# Patient Record
Sex: MALE | Race: WHITE
[De-identification: names, ages, dates, MRNs, and addresses within clinical notes are randomized per-mention and may not be internally consistent; named-entity substitution may affect disease eponyms.]

---
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
## 2022-02-12 ENCOUNTER — HOSPITAL ENCOUNTER (INPATIENT)
Facility: MEDICAL CENTER | Age: 22
LOS: 1 days | DRG: 090 | End: 2022-02-13
Attending: EMERGENCY MEDICINE | Admitting: SURGERY
Payer: COMMERCIAL

## 2022-02-12 ENCOUNTER — APPOINTMENT (OUTPATIENT)
Dept: RADIOLOGY | Facility: MEDICAL CENTER | Age: 22
DRG: 090 | End: 2022-02-12
Attending: SURGERY
Payer: COMMERCIAL

## 2022-02-12 ENCOUNTER — APPOINTMENT (OUTPATIENT)
Dept: RADIOLOGY | Facility: MEDICAL CENTER | Age: 22
DRG: 090 | End: 2022-02-12
Attending: EMERGENCY MEDICINE
Payer: COMMERCIAL

## 2022-02-12 DIAGNOSIS — S09.90XA CLOSED HEAD INJURY, INITIAL ENCOUNTER: ICD-10-CM

## 2022-02-12 DIAGNOSIS — S01.111A LACERATION OF RIGHT EYEBROW, INITIAL ENCOUNTER: ICD-10-CM

## 2022-02-12 DIAGNOSIS — R46.89 COMBATIVE BEHAVIOR: ICD-10-CM

## 2022-02-12 PROBLEM — Z53.09 CONTRAINDICATION TO DEEP VEIN THROMBOSIS (DVT) PROPHYLAXIS: Status: ACTIVE | Noted: 2022-02-12

## 2022-02-12 PROBLEM — Z11.52 ENCOUNTER FOR SCREENING FOR COVID-19: Status: ACTIVE | Noted: 2022-02-12

## 2022-02-12 PROBLEM — T14.90XA TRAUMA: Status: ACTIVE | Noted: 2022-02-12

## 2022-02-12 PROBLEM — S06.0X9A CONCUSSION WITH LOSS OF CONSCIOUSNESS: Status: ACTIVE | Noted: 2022-02-12

## 2022-02-12 PROBLEM — F07.81 CONCUSSION SYNDROME: Status: ACTIVE | Noted: 2022-02-12

## 2022-02-12 LAB
ABO GROUP BLD: NORMAL
ALBUMIN SERPL BCP-MCNC: 5 G/DL (ref 3.2–4.9)
ALBUMIN/GLOB SERPL: 1.9 G/DL
ALP SERPL-CCNC: 192 U/L (ref 30–99)
ALT SERPL-CCNC: 21 U/L (ref 2–50)
AMPHET UR QL SCN: NEGATIVE
ANION GAP SERPL CALC-SCNC: 18 MMOL/L (ref 7–16)
APTT PPP: 27.9 SEC (ref 24.7–36)
AST SERPL-CCNC: 33 U/L (ref 12–45)
BARBITURATES UR QL SCN: NEGATIVE
BENZODIAZ UR QL SCN: POSITIVE
BILIRUB SERPL-MCNC: 0.4 MG/DL (ref 0.1–1.5)
BLD GP AB SCN SERPL QL: NORMAL
BUN SERPL-MCNC: 13 MG/DL (ref 8–22)
BZE UR QL SCN: NEGATIVE
CALCIUM SERPL-MCNC: 9.6 MG/DL (ref 8.5–10.5)
CANNABINOIDS UR QL SCN: NEGATIVE
CHLORIDE SERPL-SCNC: 102 MMOL/L (ref 96–112)
CO2 SERPL-SCNC: 20 MMOL/L (ref 20–33)
CREAT SERPL-MCNC: 0.76 MG/DL (ref 0.5–1.4)
ERYTHROCYTE [DISTWIDTH] IN BLOOD BY AUTOMATED COUNT: 37.7 FL (ref 35.9–50)
ETHANOL BLD-MCNC: <10.1 MG/DL (ref 0–10)
GLOBULIN SER CALC-MCNC: 2.7 G/DL (ref 1.9–3.5)
GLUCOSE SERPL-MCNC: 148 MG/DL (ref 65–99)
HCT VFR BLD AUTO: 46.4 % (ref 42–52)
HGB BLD-MCNC: 16.2 G/DL (ref 14–18)
INR PPP: 1.12 (ref 0.87–1.13)
MCH RBC QN AUTO: 30.2 PG (ref 27–33)
MCHC RBC AUTO-ENTMCNC: 34.9 G/DL (ref 33.7–35.3)
MCV RBC AUTO: 86.4 FL (ref 81.4–97.8)
METHADONE UR QL SCN: NEGATIVE
OPIATES UR QL SCN: NEGATIVE
OXYCODONE UR QL SCN: NEGATIVE
PCP UR QL SCN: NEGATIVE
PLATELET # BLD AUTO: 181 K/UL (ref 164–446)
PMV BLD AUTO: 10.6 FL (ref 9–12.9)
POTASSIUM SERPL-SCNC: 3.4 MMOL/L (ref 3.6–5.5)
PROPOXYPH UR QL SCN: NEGATIVE
PROT SERPL-MCNC: 7.7 G/DL (ref 6–8.2)
PROTHROMBIN TIME: 14.1 SEC (ref 12–14.6)
RBC # BLD AUTO: 5.37 M/UL (ref 4.7–6.1)
RH BLD: NORMAL
SODIUM SERPL-SCNC: 140 MMOL/L (ref 135–145)
WBC # BLD AUTO: 12.8 K/UL (ref 4.8–10.8)

## 2022-02-12 PROCEDURE — 770022 HCHG ROOM/CARE - ICU (200)

## 2022-02-12 PROCEDURE — 72125 CT NECK SPINE W/O DYE: CPT

## 2022-02-12 PROCEDURE — 71260 CT THORAX DX C+: CPT

## 2022-02-12 PROCEDURE — 80307 DRUG TEST PRSMV CHEM ANLYZR: CPT

## 2022-02-12 PROCEDURE — A9270 NON-COVERED ITEM OR SERVICE: HCPCS | Performed by: SURGERY

## 2022-02-12 PROCEDURE — 86850 RBC ANTIBODY SCREEN: CPT

## 2022-02-12 PROCEDURE — 0HQ1XZZ REPAIR FACE SKIN, EXTERNAL APPROACH: ICD-10-PCS | Performed by: EMERGENCY MEDICINE

## 2022-02-12 PROCEDURE — 72131 CT LUMBAR SPINE W/O DYE: CPT

## 2022-02-12 PROCEDURE — 700105 HCHG RX REV CODE 258: Performed by: SURGERY

## 2022-02-12 PROCEDURE — 82077 ASSAY SPEC XCP UR&BREATH IA: CPT

## 2022-02-12 PROCEDURE — 99291 CRITICAL CARE FIRST HOUR: CPT

## 2022-02-12 PROCEDURE — 85610 PROTHROMBIN TIME: CPT

## 2022-02-12 PROCEDURE — 87400 INFLUENZA A/B EACH AG IA: CPT

## 2022-02-12 PROCEDURE — 86900 BLOOD TYPING SEROLOGIC ABO: CPT

## 2022-02-12 PROCEDURE — 700111 HCHG RX REV CODE 636 W/ 250 OVERRIDE (IP)

## 2022-02-12 PROCEDURE — 71045 X-RAY EXAM CHEST 1 VIEW: CPT

## 2022-02-12 PROCEDURE — 96374 THER/PROPH/DIAG INJ IV PUSH: CPT

## 2022-02-12 PROCEDURE — G0390 TRAUMA RESPONS W/HOSP CRITI: HCPCS

## 2022-02-12 PROCEDURE — 99291 CRITICAL CARE FIRST HOUR: CPT | Performed by: SURGERY

## 2022-02-12 PROCEDURE — 85027 COMPLETE CBC AUTOMATED: CPT

## 2022-02-12 PROCEDURE — 86901 BLOOD TYPING SEROLOGIC RH(D): CPT

## 2022-02-12 PROCEDURE — 72128 CT CHEST SPINE W/O DYE: CPT

## 2022-02-12 PROCEDURE — 304217 HCHG IRRIGATION SYSTEM

## 2022-02-12 PROCEDURE — 700101 HCHG RX REV CODE 250: Performed by: SURGERY

## 2022-02-12 PROCEDURE — 70450 CT HEAD/BRAIN W/O DYE: CPT

## 2022-02-12 PROCEDURE — 85730 THROMBOPLASTIN TIME PARTIAL: CPT

## 2022-02-12 PROCEDURE — 80053 COMPREHEN METABOLIC PANEL: CPT

## 2022-02-12 PROCEDURE — 304999 HCHG REPAIR-SIMPLE/INTERMED LEVEL 1

## 2022-02-12 PROCEDURE — 72170 X-RAY EXAM OF PELVIS: CPT

## 2022-02-12 PROCEDURE — 700102 HCHG RX REV CODE 250 W/ 637 OVERRIDE(OP): Performed by: SURGERY

## 2022-02-12 PROCEDURE — 87426 SARSCOV CORONAVIRUS AG IA: CPT

## 2022-02-12 PROCEDURE — 700117 HCHG RX CONTRAST REV CODE 255: Performed by: EMERGENCY MEDICINE

## 2022-02-12 PROCEDURE — 303747 HCHG EXTRA SUTURE

## 2022-02-12 RX ORDER — LORAZEPAM 2 MG/ML
3-4 INJECTION INTRAMUSCULAR
Status: DISCONTINUED | OUTPATIENT
Start: 2022-02-12 | End: 2022-02-13 | Stop reason: HOSPADM

## 2022-02-12 RX ORDER — HYDROMORPHONE HYDROCHLORIDE 1 MG/ML
1 INJECTION, SOLUTION INTRAMUSCULAR; INTRAVENOUS; SUBCUTANEOUS
Status: DISCONTINUED | OUTPATIENT
Start: 2022-02-12 | End: 2022-02-13 | Stop reason: HOSPADM

## 2022-02-12 RX ORDER — SODIUM CHLORIDE 9 MG/ML
INJECTION, SOLUTION INTRAVENOUS CONTINUOUS
Status: DISCONTINUED | OUTPATIENT
Start: 2022-02-12 | End: 2022-02-13 | Stop reason: HOSPADM

## 2022-02-12 RX ORDER — DOCUSATE SODIUM 100 MG/1
100 CAPSULE, LIQUID FILLED ORAL 2 TIMES DAILY
Status: DISCONTINUED | OUTPATIENT
Start: 2022-02-12 | End: 2022-02-13 | Stop reason: HOSPADM

## 2022-02-12 RX ORDER — ONDANSETRON 2 MG/ML
4 INJECTION INTRAMUSCULAR; INTRAVENOUS EVERY 4 HOURS PRN
Status: DISCONTINUED | OUTPATIENT
Start: 2022-02-12 | End: 2022-02-13 | Stop reason: HOSPADM

## 2022-02-12 RX ORDER — AMOXICILLIN 250 MG
1 CAPSULE ORAL
Status: DISCONTINUED | OUTPATIENT
Start: 2022-02-12 | End: 2022-02-13 | Stop reason: HOSPADM

## 2022-02-12 RX ORDER — IBUPROFEN 200 MG
400 TABLET ORAL PRN
COMMUNITY

## 2022-02-12 RX ORDER — POLYETHYLENE GLYCOL 3350 17 G/17G
1 POWDER, FOR SOLUTION ORAL 2 TIMES DAILY
Status: DISCONTINUED | OUTPATIENT
Start: 2022-02-12 | End: 2022-02-13 | Stop reason: HOSPADM

## 2022-02-12 RX ORDER — ENEMA 19; 7 G/133ML; G/133ML
1 ENEMA RECTAL
Status: DISCONTINUED | OUTPATIENT
Start: 2022-02-12 | End: 2022-02-13 | Stop reason: HOSPADM

## 2022-02-12 RX ORDER — ACETAMINOPHEN 650 MG/1
975 SUPPOSITORY RECTAL EVERY 6 HOURS PRN
Status: DISCONTINUED | OUTPATIENT
Start: 2022-02-17 | End: 2022-02-13

## 2022-02-12 RX ORDER — BISACODYL 10 MG
10 SUPPOSITORY, RECTAL RECTAL
Status: DISCONTINUED | OUTPATIENT
Start: 2022-02-12 | End: 2022-02-13 | Stop reason: HOSPADM

## 2022-02-12 RX ORDER — KETAMINE HYDROCHLORIDE 50 MG/ML
INJECTION, SOLUTION INTRAMUSCULAR; INTRAVENOUS
Status: COMPLETED | OUTPATIENT
Start: 2022-02-12 | End: 2022-02-12

## 2022-02-12 RX ORDER — LORAZEPAM 2 MG/ML
1-2 INJECTION INTRAMUSCULAR
Status: DISCONTINUED | OUTPATIENT
Start: 2022-02-12 | End: 2022-02-13 | Stop reason: HOSPADM

## 2022-02-12 RX ORDER — ACETAMINOPHEN 650 MG/1
975 SUPPOSITORY RECTAL EVERY 6 HOURS
Status: DISCONTINUED | OUTPATIENT
Start: 2022-02-12 | End: 2022-02-13

## 2022-02-12 RX ORDER — MIDAZOLAM HYDROCHLORIDE 1 MG/ML
INJECTION INTRAMUSCULAR; INTRAVENOUS
Status: COMPLETED
Start: 2022-02-12 | End: 2022-02-12

## 2022-02-12 RX ORDER — CETIRIZINE HYDROCHLORIDE 10 MG/1
10 TABLET ORAL DAILY
COMMUNITY

## 2022-02-12 RX ORDER — HYDROMORPHONE HYDROCHLORIDE 1 MG/ML
0.5 INJECTION, SOLUTION INTRAMUSCULAR; INTRAVENOUS; SUBCUTANEOUS
Status: DISCONTINUED | OUTPATIENT
Start: 2022-02-12 | End: 2022-02-13 | Stop reason: HOSPADM

## 2022-02-12 RX ORDER — ONDANSETRON 4 MG/1
4 TABLET, ORALLY DISINTEGRATING ORAL EVERY 4 HOURS PRN
Status: DISCONTINUED | OUTPATIENT
Start: 2022-02-12 | End: 2022-02-13 | Stop reason: HOSPADM

## 2022-02-12 RX ORDER — AMOXICILLIN 250 MG
1 CAPSULE ORAL NIGHTLY
Status: DISCONTINUED | OUTPATIENT
Start: 2022-02-12 | End: 2022-02-13 | Stop reason: HOSPADM

## 2022-02-12 RX ADMIN — IOHEXOL 80 ML: 350 INJECTION, SOLUTION INTRAVENOUS at 16:32

## 2022-02-12 RX ADMIN — SODIUM CHLORIDE: 9 INJECTION, SOLUTION INTRAVENOUS at 19:00

## 2022-02-12 RX ADMIN — ACETAMINOPHEN 975 MG: 650 SUPPOSITORY RECTAL at 19:00

## 2022-02-12 RX ADMIN — KETAMINE HYDROCHLORIDE 50 MG: 50 INJECTION INTRAMUSCULAR; INTRAVENOUS at 16:01

## 2022-02-12 RX ADMIN — SENNOSIDES AND DOCUSATE SODIUM 1 TABLET: 50; 8.6 TABLET ORAL at 21:13

## 2022-02-12 RX ADMIN — MIDAZOLAM HYDROCHLORIDE 4 MG: 1 INJECTION, SOLUTION INTRAMUSCULAR; INTRAVENOUS at 17:19

## 2022-02-12 RX ADMIN — KETAMINE HYDROCHLORIDE 50 MG: 50 INJECTION INTRAMUSCULAR; INTRAVENOUS at 16:04

## 2022-02-12 ASSESSMENT — LIFESTYLE VARIABLES
EVER HAD A DRINK FIRST THING IN THE MORNING TO STEADY YOUR NERVES TO GET RID OF A HANGOVER: NO
ALCOHOL_USE: NO
EVER FELT BAD OR GUILTY ABOUT YOUR DRINKING: NO
HOW MANY TIMES IN THE PAST YEAR HAVE YOU HAD 5 OR MORE DRINKS IN A DAY: 0
TOTAL SCORE: 0
HAVE PEOPLE ANNOYED YOU BY CRITICIZING YOUR DRINKING: NO
TOTAL SCORE: 0
CONSUMPTION TOTAL: NEGATIVE
HAVE YOU EVER FELT YOU SHOULD CUT DOWN ON YOUR DRINKING: NO
ON A TYPICAL DAY WHEN YOU DRINK ALCOHOL HOW MANY DRINKS DO YOU HAVE: 0
AVERAGE NUMBER OF DAYS PER WEEK YOU HAVE A DRINK CONTAINING ALCOHOL: 0
TOTAL SCORE: 0
DOES PATIENT WANT TO STOP DRINKING: NO

## 2022-02-12 ASSESSMENT — COGNITIVE AND FUNCTIONAL STATUS - GENERAL
DAILY ACTIVITIY SCORE: 24
SUGGESTED CMS G CODE MODIFIER MOBILITY: CH
SUGGESTED CMS G CODE MODIFIER DAILY ACTIVITY: CH
MOBILITY SCORE: 24

## 2022-02-12 ASSESSMENT — PATIENT HEALTH QUESTIONNAIRE - PHQ9
SUM OF ALL RESPONSES TO PHQ9 QUESTIONS 1 AND 2: 0
2. FEELING DOWN, DEPRESSED, IRRITABLE, OR HOPELESS: NOT AT ALL
1. LITTLE INTEREST OR PLEASURE IN DOING THINGS: NOT AT ALL

## 2022-02-12 ASSESSMENT — FIBROSIS 4 INDEX
FIB4 SCORE: 0.76
FIB4 SCORE: 0.76

## 2022-02-13 VITALS
SYSTOLIC BLOOD PRESSURE: 110 MMHG | HEIGHT: 67 IN | DIASTOLIC BLOOD PRESSURE: 56 MMHG | WEIGHT: 119.27 LBS | OXYGEN SATURATION: 97 % | RESPIRATION RATE: 17 BRPM | BODY MASS INDEX: 18.72 KG/M2 | TEMPERATURE: 97.8 F | HEART RATE: 54 BPM

## 2022-02-13 PROBLEM — S01.81XA FACE LACERATIONS, INITIAL ENCOUNTER: Status: ACTIVE | Noted: 2022-02-13

## 2022-02-13 LAB
ABO + RH BLD: NORMAL
ALBUMIN SERPL BCP-MCNC: 4 G/DL (ref 3.2–4.9)
ALBUMIN/GLOB SERPL: 1.9 G/DL
ALP SERPL-CCNC: 153 U/L (ref 30–99)
ALT SERPL-CCNC: 16 U/L (ref 2–50)
ANION GAP SERPL CALC-SCNC: 10 MMOL/L (ref 7–16)
AST SERPL-CCNC: 31 U/L (ref 12–45)
BASOPHILS # BLD AUTO: 0.3 % (ref 0–1.8)
BASOPHILS # BLD: 0.03 K/UL (ref 0–0.12)
BILIRUB SERPL-MCNC: 0.6 MG/DL (ref 0.1–1.5)
BUN SERPL-MCNC: 10 MG/DL (ref 8–22)
CALCIUM SERPL-MCNC: 8.9 MG/DL (ref 8.5–10.5)
CHLORIDE SERPL-SCNC: 108 MMOL/L (ref 96–112)
CO2 SERPL-SCNC: 23 MMOL/L (ref 20–33)
CREAT SERPL-MCNC: 0.65 MG/DL (ref 0.5–1.4)
EOSINOPHIL # BLD AUTO: 0.29 K/UL (ref 0–0.51)
EOSINOPHIL NFR BLD: 3.1 % (ref 0–6.9)
ERYTHROCYTE [DISTWIDTH] IN BLOOD BY AUTOMATED COUNT: 38.6 FL (ref 35.9–50)
FLUAV AG SPEC QL IA: NEGATIVE
FLUBV AG SPEC QL IA: NEGATIVE
GLOBULIN SER CALC-MCNC: 2.1 G/DL (ref 1.9–3.5)
GLUCOSE SERPL-MCNC: 94 MG/DL (ref 65–99)
HCT VFR BLD AUTO: 41.6 % (ref 42–52)
HGB BLD-MCNC: 14.3 G/DL (ref 14–18)
IMM GRANULOCYTES # BLD AUTO: 0.02 K/UL (ref 0–0.11)
IMM GRANULOCYTES NFR BLD AUTO: 0.2 % (ref 0–0.9)
LYMPHOCYTES # BLD AUTO: 2.63 K/UL (ref 1–4.8)
LYMPHOCYTES NFR BLD: 28.5 % (ref 22–41)
MCH RBC QN AUTO: 29.9 PG (ref 27–33)
MCHC RBC AUTO-ENTMCNC: 34.4 G/DL (ref 33.7–35.3)
MCV RBC AUTO: 87 FL (ref 81.4–97.8)
MONOCYTES # BLD AUTO: 0.63 K/UL (ref 0–0.85)
MONOCYTES NFR BLD AUTO: 6.8 % (ref 0–13.4)
NEUTROPHILS # BLD AUTO: 5.64 K/UL (ref 1.82–7.42)
NEUTROPHILS NFR BLD: 61.1 % (ref 44–72)
NRBC # BLD AUTO: 0 K/UL
NRBC BLD-RTO: 0 /100 WBC
PLATELET # BLD AUTO: 163 K/UL (ref 164–446)
PMV BLD AUTO: 10.8 FL (ref 9–12.9)
POTASSIUM SERPL-SCNC: 3.5 MMOL/L (ref 3.6–5.5)
PROT SERPL-MCNC: 6.1 G/DL (ref 6–8.2)
RBC # BLD AUTO: 4.78 M/UL (ref 4.7–6.1)
SARS-COV+SARS-COV-2 AG RESP QL IA.RAPID: NOTDETECTED
SODIUM SERPL-SCNC: 141 MMOL/L (ref 135–145)
SPECIMEN SOURCE: NORMAL
WBC # BLD AUTO: 9.2 K/UL (ref 4.8–10.8)

## 2022-02-13 PROCEDURE — A9270 NON-COVERED ITEM OR SERVICE: HCPCS | Performed by: SURGERY

## 2022-02-13 PROCEDURE — 85025 COMPLETE CBC W/AUTO DIFF WBC: CPT

## 2022-02-13 PROCEDURE — 700111 HCHG RX REV CODE 636 W/ 250 OVERRIDE (IP): Performed by: SURGERY

## 2022-02-13 PROCEDURE — 700102 HCHG RX REV CODE 250 W/ 637 OVERRIDE(OP): Performed by: SURGERY

## 2022-02-13 PROCEDURE — 700105 HCHG RX REV CODE 258: Performed by: SURGERY

## 2022-02-13 PROCEDURE — 92523 SPEECH SOUND LANG COMPREHEN: CPT

## 2022-02-13 PROCEDURE — 700102 HCHG RX REV CODE 250 W/ 637 OVERRIDE(OP): Performed by: NURSE PRACTITIONER

## 2022-02-13 PROCEDURE — C9803 HOPD COVID-19 SPEC COLLECT: HCPCS | Performed by: SURGERY

## 2022-02-13 PROCEDURE — A9270 NON-COVERED ITEM OR SERVICE: HCPCS | Performed by: NURSE PRACTITIONER

## 2022-02-13 PROCEDURE — 99238 HOSP IP/OBS DSCHRG MGMT 30/<: CPT | Performed by: PHYSICIAN ASSISTANT

## 2022-02-13 PROCEDURE — 80053 COMPREHEN METABOLIC PANEL: CPT

## 2022-02-13 RX ORDER — ACETAMINOPHEN 500 MG
1000 TABLET ORAL EVERY 6 HOURS
Status: DISCONTINUED | OUTPATIENT
Start: 2022-02-13 | End: 2022-02-13 | Stop reason: HOSPADM

## 2022-02-13 RX ORDER — POTASSIUM CHLORIDE 20 MEQ/1
40 TABLET, EXTENDED RELEASE ORAL ONCE
Status: COMPLETED | OUTPATIENT
Start: 2022-02-13 | End: 2022-02-13

## 2022-02-13 RX ORDER — ACETAMINOPHEN 500 MG
1000 TABLET ORAL EVERY 6 HOURS PRN
COMMUNITY
Start: 2022-02-13

## 2022-02-13 RX ADMIN — SODIUM CHLORIDE: 9 INJECTION, SOLUTION INTRAVENOUS at 07:15

## 2022-02-13 RX ADMIN — ACETAMINOPHEN 1000 MG: 500 TABLET ORAL at 06:04

## 2022-02-13 RX ADMIN — POTASSIUM CHLORIDE 40 MEQ: 1500 TABLET, EXTENDED RELEASE ORAL at 09:48

## 2022-02-13 RX ADMIN — ENOXAPARIN SODIUM 30 MG: 30 INJECTION SUBCUTANEOUS at 09:48

## 2022-02-13 RX ADMIN — ACETAMINOPHEN 1000 MG: 500 TABLET ORAL at 12:32

## 2022-02-13 ASSESSMENT — PAIN DESCRIPTION - PAIN TYPE
TYPE: ACUTE PAIN
TYPE: ACUTE PAIN

## 2022-02-13 NOTE — CARE PLAN
The patient is Stable - Low risk of patient condition declining or worsening    Shift Goals  Clinical Goals: Stable neuro exams, hemodynamic stability, adequate urine output  Patient Goals: Rest  Family Goals: No family present    Progress made toward(s) clinical / shift goals:      Problem: Knowledge Deficit - Standard  Goal: Patient and family/care givers will demonstrate understanding of plan of care, disease process/condition, diagnostic tests and medications  Outcome: Met     Problem: Pain - Standard  Goal: Alleviation of pain or a reduction in pain to the patient’s comfort goal  Outcome: Met       Patient is not progressing towards the following goals:
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
## 2022-02-13 NOTE — PROGRESS NOTES
Patient arrived from ED with ACLS RN and CCT on monitor.    Vital signs    HR 89  /62  SpO2 99 RA  Temp 98.9F  Wt. 51.4kg bed scale    4 eye skin assessment with Nelda Roy RN and Alyce ALMANZAR    Bruising to bilateral knees  Rt eye brow lac sutured

## 2022-02-13 NOTE — DISCHARGE SUMMARY
Trauma Discharge Summary    DATE OF ADMISSION: 2/12/2022    DATE OF DISCHARGE: 2/13/2022    LENGTH OF STAY: 2 days    ATTENDING PHYSICIAN: Harsh Blankenship M.D.    CONSULTING PHYSICIAN:   1. N/A    DISCHARGE DIAGNOSIS:  Principal Problem:    Concussion syndrome POA: Yes  Active Problems:    Concussion with loss of consciousness POA: Yes    Encounter for screening for COVID-19 POA: Yes    Contraindication to deep vein thrombosis (DVT) prophylaxis POA: Yes    Face lacerations, initial encounter POA: Yes    Trauma POA: Yes  Resolved Problems:    * No resolved hospital problems. *      PROCEDURES:  1. N/A    HISTORY OF PRESENT ILLNESS: The patient is a 21 y.o. male [place]  He was [place]ed.  He did have a right eyebrow laceration and abrasions across the frontal area.  Patient was combative and received sedation including ketamine.  His initial GCS was 14.  The patient was transferred to [facility] in [place], [place].    HOSPITAL COURSE: The patient was triaged as a trauma activation. The patient was transported to intensive care unit.  The patient was found to have a concussion with positive loss of consciousness. Head CT without contrast showed no intracranial bleeding.  His blood alcohol was less than 0.01 and his toxicology screen was positive for benzodiazepines which he was given in the ED. This injury was treated nonoperatively with observation and frequent neurological examinations.  On the day of discharge the patient's neuro examination had no deficits and GCS was 15.  The patient had a cognitive evaluation and was found to have no deficits and no further need for therapy after discharge.  The patient was found to have a laceration  through the right eyebrow.  This was repaired with sutures by the ER physician.  The sutures will need to be removed in 5 days.  The mother was counseled and felt comfortable removing the sutures.  She was given a suture  removal kit on the day of discharge.  On the day of discharge the patient was afebrile, vital signs stable, he was tolerating a regular diet, oxygen saturation was greater than 90% on room air, he was ambulating with minimal assistance, his pain was well controlled on current regimen of Tylenol, his neurological examination had no deficits and his GCS is 15.  The patient wished to be discharged home, discharge medications and instructions were discussed with he and his family.  He was then discharged home with his mother.    HOSPITAL PROBLEM LIST:  Concussion with loss of consciousness- (present on admission)  Assessment & Plan  Snowboard rider was found down.    Evaluated under trauma red status, opens eyes spontaneously, not following commands, moves all extremities volitionally localizes.    Speech is unintelligible.    GCS 10.  CT negative, blood alcohol negative.  Drug screen positive for benzodiazepines, patient was given versed in ED.   Admission to ICU for close observation.  2/13 Neuro exam improved, GCS 15.    Face lacerations, initial encounter- (present on admission)  Assessment & Plan  Right eyebrow.  Repaired with sutures by ERP.  Sutures will need to be removed in 5 days.    Contraindication to deep vein thrombosis (DVT) prophylaxis- (present on admission)  Assessment & Plan  Prophylactic anticoagulation for thrombotic prevention initially contraindicated secondary to elevated bleeding risk.  2/14 Trauma surveillance venous duplex scanning ordered.    Encounter for screening for COVID-19- (present on admission)  Assessment & Plan  Admission SARS-CoV-2 testing negative. Repeat SARS-CoV-2 testing not indicated. Isolation precautions de-escalated.    Trauma- (present on admission)  Assessment & Plan  Snowboarder found down in trees. Combative at the scene.  Trauma Red Activation.  Harsh Blankenship MD. Trauma Surgery.      DISCHARGE PHYSICAL EXAM: See epic physical exam dated 2/13/2022    DISPOSITION:  Discharged home with family on 2/13/2022. The patient was and family were counseled and questions were answered. Specifically, signs and symptoms of infection, respiratory decompensation, neurological decompensation and persistent or worsening pain were discussed and the patient agrees to seek medical attention if any of these develop.    DISCHARGE MEDICATIONS:  The patients controlled substance history was reviewed and a controlled substance use informed consent (if applicable) was provided by [facility] and the patient has been prescribed.     Medication List      START taking these medications      Instructions   acetaminophen 500 MG Tabs  Commonly known as: TYLENOL   Take 2 Tablets by mouth every 6 hours as needed.  Dose: 1,000 mg        CONTINUE taking these medications      Instructions   cetirizine 10 MG Tabs  Commonly known as: ZYRTEC   Take 10 mg by mouth every day.  Dose: 10 mg     ibuprofen 200 MG Tabs  Commonly known as: MOTRIN   Take 400 mg by mouth as needed for Mild Pain.  Dose: 400 mg            ACTIVITY:  As tolerated.  No strenuous activities or contact sports for 2 weeks.    WOUND CARE:  Keep wound clean and dry.  May shower but do not soak for 2 weeks.  Sutures to be removed in 5 days.    DIET:  Orders Placed This Encounter   Procedures   • Diet Order Diet: Regular     Standing Status:   Standing     Number of Occurrences:   1     Order Specific Question:   Diet:     Answer:   Regular [1]       FOLLOW UP:  Harsh Blankenship M.D.  [address] [facility] [address]  [place] [id number] [phone]    Call  As needed    Primary Care Provider    Schedule an appointment as soon as possible for a visit in 1 week  For post admission follow up and referrals if needed      TIME SPENT ON DISCHARGE: 30 minutes      ____________________________________________  Ivonne Garcia P.A.-C.    DD: 2/13/2022 1:34 PM

## 2022-02-13 NOTE — H&P
CHIEF COMPLAINT: [place], stigmata of trauma and altered mental status    HISTORY OF PRESENT ILLNESS: The patient is a 21-year-old male.  [place]  He did have stigmata of trauma with a right eyebrow laceration and abrasions across the frontal area.  Patient was combative and has received sedating medication including ketamine.  GCS was initially 14 and has been approximately 10 here with spontaneous eye opening moving all extremities purposefully and garbled speech.  Patient will not follow.  Patient had no other embarrassment on primary survey chest x-ray and pelvis were normal patient has been subjected to detailed imaging of the head and spine chest and thorax.  By CT there are no internal injuries.  Blood alcohol was low or normal.  Being observed in the ER with a concussion.  Admission status will be based on trajectory of recovery.  Drug screen pending    TRIAGE CATEGORY: The patient was triaged as a Trauma Red activation. An expeditious primary and secondary survey with required adjuncts was conducted. See Trauma Narrator for full details.    PAST MEDICAL HISTORY:  has no past medical history on file.    PAST SURGICAL HISTORY:  has no past surgical history on file.    ALLERGIES:   Allergies   Allergen Reactions   • Penicillins Rash     Rash at 3y/o       CURRENT MEDICATIONS:   Home Medications     Reviewed by Mathieu Liz R.N. (Registered Nurse) on 02/12/22 at 1632  Med List Status: Complete   Medication Last Dose Status        Patient Dayday Taking any Medications                       FAMILY HISTORY: family history is not on file.    SOCIAL HISTORY:  reports that he has never smoked. He does not have any smokeless tobacco history on file.    REVIEW OF SYSTEMS: Comprehensive review of systems is not able to be elicited from the patient secondary to the acuity of the clinical situation.    PHYSICAL EXAMINATION:      Constitutional:     Vital Signs: /72   " Pulse (!) 102   Temp 36.7 °C (98 °F) (Temporal)   Resp 14   Ht 1.702 m (5' 7\")   Wt 54.4 kg (120 lb)   SpO2 97%    General Appearance: The patient is a uncooperative and confused appearing young man in mild distress.  HEENT:    No significant external craniofacial trauma. 3 cm laceration above the right eyebrow and facial abrasion. The pupils are equal, round, and reactive to light bilaterally. The extraocular muscles are intact bilaterally. The extraocular muscles cannot be assessed. The ear canals and tympanic membranes are clear bilaterally. The nares and oropharynx are clear. The midface and jaw are stable.  No malocclusion is evident.  Neck:    The cervical spine is immobilized with a hard collar.  Respiratory:   Inspection: Unlabored respirations, no intercostal retractions, paradoxical motion, or accessory muscle use.   Palpation:  The chest is nontender. The clavicles are non deformed bilaterally.   Auscultation: normal.  Cardiovascular:   Inspection: The skin is warm, dry and well purfused.  Auscultation: Regular rate and rhythm.   Peripheral Pulses:   Abdomen:   Inspection: Abdominal inspection reveals no abrasions, contusions, lacerations or penetrating wounds.   Palpation: Palpation is remarkable for no significant tenderness, guarding, or peritoneal findings.  Genitourinary:   (MALE): normal male external genitalia.  Musculoskeletal:   The pelvis is stable. No significant angulation, deformity, or soft tissue injury involving the upper and lower extremities.  Back:   The thoracolumbar spine was examined utilizing spinal motion restriction. Examination is remarkable for no significant tenderness, swelling, or deformity in the thoracolumbar region.  Skin:    Examination of the skin reveals no significant abrasions, contusion, lacerations, or other soft tissue injury.  Neurologic:    Paul Smiths Coma Scale (GCS) Eye Opening (spontaneous 4), Verbal Response (inappropriate words 3), Best Motor Response " (localizes to pain 5).    Neurologic examination reveals symmetrical movement and reflexes mental status is abnormal  Psychiatric:   Cannot be assessed.    LABORATORY VALUES:   Recent Labs     02/12/22  1608   WBC 12.8*   RBC 5.37   HEMOGLOBIN 16.2   HEMATOCRIT 46.4   MCV 86.4   MCH 30.2   MCHC 34.9   RDW 37.7   PLATELETCT 181   MPV 10.6     Recent Labs     02/12/22  1608   SODIUM 140   POTASSIUM 3.4*   CHLORIDE 102   CO2 20   GLUCOSE 148*   BUN 13   CREATININE 0.76   CALCIUM 9.6     Recent Labs     02/12/22  1608   ASTSGOT 33   ALTSGPT 21   TBILIRUBIN 0.4   ALKPHOSPHAT 192*   GLOBULIN 2.7   INR 1.12     Recent Labs     02/12/22  1608   APTT 27.9   INR 1.12        IMAGING:   CT-TSPINE W/O PLUS RECONS   Final Result      1.  No acute fracture or traumatic listhesis in the thoracic spine.   2.  Multilevel Schmorl's nodes and disc height loss involving the mid and lower thoracic spine, advanced for patient's age.      CT-LSPINE W/O PLUS RECONS   Final Result      1.  No acute fracture or traumatic listhesis in the lumbar spine.   2.  Bilateral L5 spondylolysis, with grade 1 spondylolisthesis.      CT-CHEST,ABDOMEN,PELVIS WITH   Final Result      No significant abnormality in thorax, abdomen and pelvis CT scan.      CT-CSPINE WITHOUT PLUS RECONS   Final Result      No acute fracture or dislocation seen in the CT scan of the cervical spine.      CT-HEAD W/O   Final Result         NO ACUTE ABNORMALITIES ARE NOTED ON CT SCAN OF THE HEAD.               DX-PELVIS-1 OR 2 VIEWS   Final Result      1.  No acute fracture or dislocation is identified.      DX-CHEST-LIMITED (1 VIEW)   Final Result         No acute cardiac or pulmonary abnormality is identified.      US-ABORTED US PROCEDURE    (Results Pending)       ASSESSMENT AND PLAN:     Concussion with loss of consciousness  Young snow rider was found down.  Evaluated under trauma red status, opens eyes spontaneously not following commands moves all extremities volitionally  localizes.  Speech is unintelligible.  GCS 10, CT negative, blood alcohol negative  Drug screen  Short period of ER observation pending disposition based on the trajectory of recovery      DISPOSITION: Pending disposition.  Trauma tertiary survey.    CRITICAL CARE TIME: 55 minutes excluding procedures.       ____________________________________     Harsh Blankenship M.D.    DD: 2/12/2022  5:00 PM

## 2022-02-13 NOTE — ED NOTES
Wound irrigated. ERP bedside suturing pts laceration. Pt becoming combative with staff. Pt given 4mg of IV versed.

## 2022-02-13 NOTE — PROGRESS NOTES
Discharge package printed. Discharge instructions reviewed with patient and patient's mom at bedside, all questions answered. Socks located in personal belongings locker given to patient. Peripheral IV's and monitoring equipment removed. Patient discharged home with his mom.

## 2022-02-13 NOTE — PROGRESS NOTES
Ortho team paged at 16:00  PABEVERLY arrival in trauma bay at 16:01  Trauma red no ortho needs found

## 2022-02-13 NOTE — ED NOTES
Med Rec completed per patient's parents at bedside  Allergies reviewed  No ORAL antibiotics in last 30 days

## 2022-02-13 NOTE — ED TRIAGE NOTES
Pt bib [facility] from [facility].  Chief Complaint   Patient presents with   • Trauma Red     20 y/o skier, found down     Pt was found down in the trees, yelling out. Pt combative w/ EMS. Initial GCS 12, CHAUDHRY.  See trauma eval.     Pt to CT w/ RN on monitor. Report to JENELLE Torres.  Mother now @ BS.

## 2022-02-13 NOTE — ASSESSMENT & PLAN NOTE
Snowboard rider was found down.    Evaluated under trauma red status, opens eyes spontaneously, not following commands, moves all extremities volitionally localizes.    Speech is unintelligible.    GCS 10.  CT negative, blood alcohol negative.  Drug screen positive for benzodiazepines, patient was given versed in ED.   Admission to ICU for close observation.  2/13 Neuro exam improved, GCS 15.

## 2022-02-13 NOTE — ED PROVIDER NOTES
ED Provider Note    Scribed for Narendra Perdomo M.D. by Narendra Perdomo M.D.. 2/12/2022,  4:15 PM.    CHIEF COMPLAINT  Chief Complaint   Patient presents with   • Trauma Red     20 y/o skier, found down       [facility] Pj-Cristhian is a 21 y.o. male who presents to the Emergency Department as a trauma red, transferred from the [facility], where he was found alone, in the trees off of a [place] run, combative, speaking nonsensically, with a laceration over his right eyebrow.  He remained combative in transport and required to wear 3 doses of ketamine, for a total of 100 mg.  He is still combative on arrival, requiring a repeat dose of ketamine, and received a total of 100 additional milligrams in the emergency department to facilitate physical exam, lab draw, and CT scanning.  Patient is moving all extremities, localizing to noxious stimuli, vocalizing, but incoherent sounds.  EMS reported stable vital signs in route, normal blood sugar.     Caveat: Cannot participate in history and review of systems because of combativeness requiring sedative medications.    REVIEW OF SYSTEMS  See [facility] for further details. All other systems are negative.     PAST MEDICAL HISTORY   Unable to obtain due to clinical condition.    SOCIAL HISTORY  Social History     Tobacco Use   • Smoking status: Never Smoker   • Smokeless tobacco: Not on file   Vaping Use   • Vaping Use: Never used   Substance and Sexual Activity   • Alcohol use: Not on file   • Drug use: Not on file   • Sexual activity: Not on file     Social History     Substance and Sexual Activity   Drug Use Not on file       SURGICAL HISTORY  patient denies any surgical history    CURRENT MEDICATIONS  Home Medications     Reviewed by Irma Seals (Pharmacy Tech) on 02/12/22 at 1736  Med List Status: Complete   Medication Last Dose Status   cetirizine (ZYRTEC) 10 MG Tab 2/11/2022 Active   ibuprofen (MOTRIN) 200 MG Tab unk Active           "      ALLERGIES  Allergies   Allergen Reactions   • Penicillins Hives and Rash     Rash at 5y/o       PRIMARY SURVEY:    Airway: Phonating well,clear  Breathing: Equal breath sounds bilaterally  Circulation: Normal heart sounds 2+ pulses at bilateral radial and femoral arteries  Disability:  GCS E4V2M5 = 11 compromised by ketamine en route  Exposure: No gross deformity or hemorrhage    /52   Pulse (!) 59   Temp 36.7 °C (98 °F) (Temporal)   Resp 14   Ht 1.702 m (5' 7\")   Wt 54.4 kg (120 lb)   SpO2 100%     Secondary Survey:      Constitutional: GCS 11, combative, normal vital signs.    Heent: Head is normocephalic, 3cm lac above R eyebrow, Pupils 3mm reactive bilaterally. Midface stable. No malocclusion.  No septal hematoma.  Neck: No tracheal deviation. No visible pr palpable abnormality. C-collar in place.   Cardiovascular: Regular rate and rhythm no murmur rub or gallop intact distal pulses peripherally x4  Pulmonary/Chest: Clavicles nontender to palpation. There is not any chest wall tenderness bilaterally.  No crepitus. Sorf wheezes bilaterally.  Abdominal: Soft, nondistended. Nontender to palpation. Pelvis is stable to AP and lateral compression.   Musculoskeletal: Right upper extremity atraumatic, palpable radial pulse. 5/5  strength. Full ROM and strength at elbow.  Left upper extremity atraumatic, palpable radial pulse. 5/5  strength. Full ROM and strength at elbow.  Right lower extremity atraumatic. 5/5 strength in ankle plantar flexion and dorsiflexion. No pain and full ROM at right knee and hip.   Left  lower extremity atraumatic. 5/5 strength in ankle plantar flexion and dorsiflexion. No pain and full ROM at left knee and hip.   Back: Midline thoracic and lumbar spines are nontender to palpation. No step-offs. Mild sacral erythema present.  : Normal male external genitalia. Rectal exam deferred. No blood visible at urethral meatus.   Neurological: Sensation intact to light touch " dorsum and plantar surfaces of both feet and the medial and lateral aspects of both lower legs.  Sensation intact to light touch dorsum and plantar surfaces of both hands.   Skin:  3cm lac above R eyebrow, Skin is warm and dry.  No diaphoresis. No erythema. No pallor.   Psychiatric:  Combative, medicated for safety. Unable to assess.       DIAGNOSTIC STUDIES / PROCEDURES      LABS  Labs Reviewed   CBC WITHOUT DIFFERENTIAL - Abnormal; Notable for the following components:       Result Value    WBC 12.8 (*)     All other components within normal limits   COMP METABOLIC PANEL - Abnormal; Notable for the following components:    Potassium 3.4 (*)     Anion Gap 18.0 (*)     Glucose 148 (*)     Alkaline Phosphatase 192 (*)     Albumin 5.0 (*)     All other components within normal limits   DIAGNOSTIC ALCOHOL   PROTHROMBIN TIME   APTT   COD (ADULT)   COMPONENT CELLULAR   ESTIMATED GFR   ABO RH CONFIRM   URINE DRUG SCREEN   URINE DRUG SCREEN     All labs reviewed by me.    RADIOLOGY  CT-TSPINE W/O PLUS RECONS   Final Result      1.  No acute fracture or traumatic listhesis in the thoracic spine.   2.  Multilevel Schmorl's nodes and disc height loss involving the mid and lower thoracic spine, advanced for patient's age.      CT-LSPINE W/O PLUS RECONS   Final Result      1.  No acute fracture or traumatic listhesis in the lumbar spine.   2.  Bilateral L5 spondylolysis, with grade 1 spondylolisthesis.      CT-CHEST,ABDOMEN,PELVIS WITH   Final Result      No significant abnormality in thorax, abdomen and pelvis CT scan.      CT-CSPINE WITHOUT PLUS RECONS   Final Result      No acute fracture or dislocation seen in the CT scan of the cervical spine.      CT-HEAD W/O   Final Result         NO ACUTE ABNORMALITIES ARE NOTED ON CT SCAN OF THE HEAD.               DX-PELVIS-1 OR 2 VIEWS   Final Result      1.  No acute fracture or dislocation is identified.      DX-CHEST-LIMITED (1 VIEW)   Final Result         No acute cardiac or  pulmonary abnormality is identified.      US-ABORTED US PROCEDURE    (Results Pending)     The radiologist's interpretation of all radiological studies have been reviewed by me.    COURSE & MEDICAL DECISION MAKING  Nursing notes, VS, PMSFHx reviewed in chart.     4:15 PM Patient seen and examined at bedside. Differential diagnosis includes but is not limited to Intracranial bleed, multi-trauma, concussion. Ordered for trauma pan scan and level 2 labs to evaluate. Patient will be treated with 100mg Ketamine for his symptoms of combativeness. PT met at bedside by trauma team.    Conscious Sedation Procedure Note    Indication: laceration, combative patient (head injury)    Consent: Consent was unable to be obtained due to patient's condition.    Physician Involvement: The attending physician was present and supervising this procedure.    Pre-Sedation Documentation and Exam: I have personally completed a history, physical exam & review of systems for this patient (see notes).  Airway Assessment: Mallampati Class I - (soft palate, fauces, uvula & anterior/posterior tonsillar pillars are visible)  f3  Prior History of Anesthesia Complications: unknown    ASA Classification: Class 1 - A normal healthy patient    Sedation/ Anesthesia Plan: intravenous sedation    Medications Used: midazolam (Versed) intravenously    Monitoring and Safety: The patient was placed on a cardiac monitor and vital signs, pulse oximetry and level of consciousness were continuously evaluated throughout the procedure. The patient was closely monitored until recovery from the medications was complete and the patient had returned to baseline status. Respiratory therapy was on standby at all times during the procedure.      (The following sections must be completed)  Post-Sedation Vital Signs: Vital signs were reviewed and were stable after the procedure (see flow sheet for vitals)            Intraservice Time: Greater than 10 minutes    Post-Sedation  Exam: Lungs: clear and Cardiovascular: normal           Complications: none    I provided both the sedation and procedure, a nurse was present at the bedside for the entire procedure.     Laceration Repair Procedure Note    Indication: Laceration    Procedure: The patient was placed in the appropriate position and anesthesia around the laceration was obtained by infiltration using 2% Lidocaine with epinephrine. The area was then irrigated with normal saline. The laceration was closed with 6-0 Ethilon using interrupted sutures. There were no additional lacerations requiring repair. The wound area was then dressed with a bandage.      Total repaired wound length: 4 cm.     Other Items: Suture count: 6    The patient tolerated the procedure well.    Complications: None    5:27 PM laceration completed as above.  Patient remained combative, metabolizing the ketamine from earlier, and required IV Versed for procedural sedation, which was successful.     5:31 PM spoke with Dr. Ching, neurosurgery, to let him know that the patient does not appear to require any interventions.  I communicated with Dr. Saini, trauma surgery, to let him know that the patient scans were unremarkable, though the patient remains combative, and I have concerns about diffuse axonal injury.  The patient will need overnight observation.      6:30 PM Pt. Admitted to trauma services in guarded condition.    The patient will return for new or worsening symptoms and is stable at the time of discharge.    The patient is referred to a primary physician for blood pressure management, diabetic screening, and for all other preventative health concerns.    DISPOSITION:  Patient will be admitted to trauma surgery in guarded condition.    FINAL IMPRESSION  1. Closed head injury, initial encounter    2. Combative behavior    3. Laceration of right eyebrow, initial encounter      Patient is critically ill.   The patient continues to have: Altered mental status,  presumably secondary to head injury.  The vital organ system that is affected is the: Brain/CNS.  If untreated there is a high chance of deterioration into: Worsening combative behavior, self injury, undiagnosed related traumatic injury,  And eventually death.   The critical care that I am providing today is: Emergent evaluation of this trauma red/potentially critically injured patient, bedside consultation with trauma, bedside review of x-rays, facilitating care by directing bedside imaging, bedside lab draw, CT technologist, interpreting multiple abnormal tests, frequently reevaluating the patient and his response to treatment, and managing agitation and violent behavior, consulting with the patient's family and keeping them updated at the bedside.  The critical that has been undertaken is medically complex.   There has been no overlap in critical care time.   Critical Care Time not including procedures: 120

## 2022-02-13 NOTE — ED NOTES
Pt resting in bed comfortably. No acute distress noted. Pts parents bedside. Will continue to monitor.

## 2022-02-13 NOTE — THERAPY
Speech Language Pathology   Initial Assessment     Patient Name: Osei Crow  AGE:  21 y.o., SEX:  male  Medical Record #: [id number]  Today's Date: 2/13/2022     22 YO male [place]12 2[place] PMHx: unknown. CMHx:concussion with LOC. Head CT negative for acute changes 2/12.       Assessment  Pt seen this date for cognitive-linguistic evaluation, pt's mother present and supportive. Pt reports living with his parents in a single story home, works on cars full time, drives, and does not take medications. The CLQT was administered in its entirety in conjunction with non-standardized assessments. Pt earned a composite severity rating of 4.0 indicating cognitive-linguistic function WNL. Pt score WNL on all subtests. Pt followed written direction with 100% accuracy and min cues. Writing WNL. Verbal expression, auditory comprehension, reading, writing, long term memory, short term memory and problem solving WNL.    Cognitive-linguistic function appreciated to be WNL. SLP evaluation completed. The patient is not being actively followed for skilled therapy services at this time, however may be seen if requested by attending provider for 1 additional visit within 30 days to address any discharge needs, or if there is a change in status.    Plan    Recommend Speech Therapy for Evaluation only for the following treatments:  Cognitive-Linguistic Training.    Discharge Recommendations: Anticipate that the patient will have no further speech therapy needs after discharge from the hospital    Subjective    Pt A&Ox4, followed directions and participated fully.      Objective       02/13/22 1132   Verbal Expression   Verbal Expression / Aphasia Eval (WDL) WDL   Auditory Comprehension   Auditory Comprehension (WDL) WDL   Reading Comprehension   Reading Comprehension (WDL) WDL   Written Expression   Written Expression (WDL) WDL   Cognitive-Linguistic   Cognitive-Linguistic (WDL) WDL   Level of Consciousness  Alert   Orientation Level Oriented x 4   Social / Pragmatic Communication   Social / Pragmatic Communication WDL   Outcome Measures   Outcome Measures Utilized CLQT   CLQT (Cognitive Linguistic Quick Test)   Attention 4   Memory 4   Executive Function 4   Language 4   Visuospatial 4   Total 4   Composite Severity Rating WNL

## 2022-02-13 NOTE — PROGRESS NOTES
"    Patient seen and examined.  Neuro exam without deficit, GCS15, no N/V.    - Discharge home today pending cog eval, toleration of diet and mobilization.    Mental status adequate for full examination?: Yes    Spine cleared (radiologically and/or clinically): Yes    PHYSICAL EXAMINATION:  Vitals: /50   Pulse (!) 49   Temp 36.9 °C (98.4 °F) (Temporal)   Resp 20   Ht 1.702 m (5' 7\")   Wt 54.1 kg (119 lb 4.3 oz)   SpO2 98%   BMI 18.68 kg/m²   Constitutional:     General Appearance: appears stated age, is in no apparent distress, is well developed and well nourished.  HEENT:    Right periorbital swelling and ecchymosis. Right eyebrow laceration CDI with sutures.. The pupils are equal, round, and reactive to light bilaterally. The extraocular muscles are intact bilaterally. No limited upward gaze or evidence of entrapment.. The nares and oropharynx are clear. The midface and jaw are stable. No malocclusion is evident.  Neck:    No posterior midline cervical-spine tenderness, no evidence of intoxication, normal level of alertness (Nikita Coma Scale 15), no focal neurologic deficit, and no painful distracting injuries. Muscular tenderness of cervical spine.  Respiratory:   Inspection: Unlabored respirations, no intercostal retractions, paradoxical motion, or accessory muscle use.   Palpation:  The chest is nontender. The clavicles are non deformed bilaterally..   Auscultation: clear to auscultation.  Cardiovascular:   Auscultation: regular rate and rhythm.   Peripheral Pulses: Normal.   Abdomen:   Abdomen is soft, nontender, without organomegaly or masses.  Musculoskeletal:   The pelvis is stable.  No significant angulation, deformity, or soft tissue injury involving the upper and lower extremities. Normal range of motion.   Back:   The thoracolumbar spine was examined. Examination is remarkable muscular tenderness, no swelling, or deformity in the thoracolumbar region.  Skin:   The skin is warm, dry and " well purfused.  Neurologic:    Rodney Coma Scale (GCS) 15 E4V5M6. Neurologic examination revealed no focal deficits noted, mental status intact, cranial nerves II through VI intact, oriented for age x3.  Psychiatric:   The patient does not appear depressed or anxious, oriented to time, place, person, short and long term memory appears intact, judgement and insight appear intact.    IMAGING:  CT-TSPINE W/O PLUS RECONS   Final Result      1.  No acute fracture or traumatic listhesis in the thoracic spine.   2.  Multilevel Schmorl's nodes and disc height loss involving the mid and lower thoracic spine, advanced for patient's age.      CT-LSPINE W/O PLUS RECONS   Final Result      1.  No acute fracture or traumatic listhesis in the lumbar spine.   2.  Bilateral L5 spondylolysis, with grade 1 spondylolisthesis.      CT-CHEST,ABDOMEN,PELVIS WITH   Final Result      No significant abnormality in thorax, abdomen and pelvis CT scan.      CT-CSPINE WITHOUT PLUS RECONS   Final Result      No acute fracture or dislocation seen in the CT scan of the cervical spine.      CT-HEAD W/O   Final Result         NO ACUTE ABNORMALITIES ARE NOTED ON CT SCAN OF THE HEAD.               DX-PELVIS-1 OR 2 VIEWS   Final Result      1.  No acute fracture or dislocation is identified.      DX-CHEST-LIMITED (1 VIEW)   Final Result         No acute cardiac or pulmonary abnormality is identified.      US-ABORTED US PROCEDURE    (Results Pending)     All current laboratory studies/radiology exams reviewed: Yes    Completed Consultations:  N/A     Pending Consultations:  None    Newly Identified Diagnoses and Injuries:  None    TOTAL RAP SCORE:  RAP Score Total: 3      Assesment ETOH: Admission BA <0.01.

## 2022-02-13 NOTE — ASSESSMENT & PLAN NOTE
Prophylactic anticoagulation for thrombotic prevention initially contraindicated secondary to elevated bleeding risk.  2/14 Trauma surveillance venous duplex scanning ordered.

## 2022-02-13 NOTE — ED NOTES
21 year old male , brought in by [facility], skier found in the trees at [facility], non cooperative and nonsensical speech. 20 mg ketamine given IM, 20 mg given IV once IV access established.  FSBS = 164 + helmet

## 2022-02-13 NOTE — DISCHARGE PLANNING
Trauma Response    Referral: Trauma Red Response    Intervention: SW responded to trauma red.  Pt was BIB [facility] after being doung in down [place] the [place] at [facility].  Pt was altered upon arrival.  Pts name is Osei Crow (: [birth year]).  SW obtained the following pt information: Per [facility] report Pt was found [place] [facility] [place].  Shortly after this Pt arrived at the ER his Parents arrived.  SW escorted Pt mother bedside once Pt was situated in the room.    Father: Sven Crow [phone]  Mother: Marv Mcqueen    Plan: SW will remain available to assist if needs arise.

## 2022-02-14 NOTE — DISCHARGE INSTRUCTIONS
- Call or seek medical attention for questions or concerns  - Please call Dr. Blankenship as needed or with questions  - Follow up with primary care provider within one weeks time  - Remove face sutures in 5 days 2/17/2022  - Resume regular diet  - May take over the counter acetaminophen or ibuprofen as needed for pain  - Continue daily over the counter stool softener while on narcotics  - No operation of machinery or motorized vehicles while under the influence of narcotics  - No alcohol, marijuana or illicit drug use while under the influence of narcotics  - In the event of a narcotic overdose naloxone (Narcan) is available without a prescription from any [facility] or [facility]  - No swimming, hot tubs, baths or wound submersion until cleared by outpatient provider. May shower  - No contact sports, strenuous activities, or heavy lifting until cleared by outpatient provider  - If respiratory decompensation, persistent or worsening pain, mental decompensation, or signs or symptoms of infection occur seek medical attention    Discharge Instructions    Discharged to home by car with relative. Discharged via walking, hospital escort: Refused.  Special equipment needed: Not Applicable    Be sure to schedule a follow-up appointment with your primary care doctor or any specialists as instructed.     Discharge Plan:   Influenza Vaccine Indication: Patient Refuses    I understand that a diet low in cholesterol, fat, and sodium is recommended for good health. Unless I have been given specific instructions below for another diet, I accept this instruction as my diet prescription.   Other diet: Regular    Special Instructions: None    · Is patient discharged on Warfarin / Coumadin?   No     Depression / Suicide Risk    As you are discharged from this [facility] facility, it is important to learn how to keep safe from harming yourself.    Recognize the warning signs:  · Abrupt changes in personality, positive or negative-  including increase in energy   · Giving away possessions  · Change in eating patterns- significant weight changes-  positive or negative  · Change in sleeping patterns- unable to sleep or sleeping all the time   · Unwillingness or inability to communicate  · Depression  · Unusual sadness, discouragement and loneliness  · Talk of wanting to die  · Neglect of personal appearance   · Rebelliousness- reckless behavior  · Withdrawal from people/activities they love  · Confusion- inability to concentrate     If you or a loved one observes any of these behaviors or has concerns about self-harm, here's what you can do:  · Talk about it- your feelings and reasons for harming yourself  · Remove any means that you might use to hurt yourself (examples: pills, rope, extension cords, firearm)  · Get professional help from the community (Mental Health, Substance Abuse, psychological counseling)  · Do not be alone:Call your Safe Contact- someone whom you trust who will be there for you.  · Call your local CRISIS HOTLINE 357-8506 or 296-687-6283  · Call your local Children's Mobile Crisis Response Team [place] (760) 853-6755 or www.[employer]  · Call the toll free National Suicide Prevention Hotlines   · National Suicide Prevention Lifeline 911-625-GRHY (4631)  · National Hope Line Network 800-SUICIDE (875-7349)